# Patient Record
Sex: MALE | Race: WHITE | NOT HISPANIC OR LATINO | Employment: STUDENT | URBAN - METROPOLITAN AREA
[De-identification: names, ages, dates, MRNs, and addresses within clinical notes are randomized per-mention and may not be internally consistent; named-entity substitution may affect disease eponyms.]

---

## 2017-08-15 ENCOUNTER — TELEPHONE (OUTPATIENT)
Dept: INTERNAL MEDICINE | Facility: CLINIC | Age: 22
End: 2017-08-15

## 2017-08-15 ENCOUNTER — OFFICE VISIT (OUTPATIENT)
Dept: INTERNAL MEDICINE | Facility: CLINIC | Age: 22
End: 2017-08-15
Payer: COMMERCIAL

## 2017-08-15 VITALS
TEMPERATURE: 98.5 F | HEART RATE: 56 BPM | OXYGEN SATURATION: 98 % | SYSTOLIC BLOOD PRESSURE: 120 MMHG | HEIGHT: 75 IN | BODY MASS INDEX: 20.4 KG/M2 | DIASTOLIC BLOOD PRESSURE: 74 MMHG | WEIGHT: 164.1 LBS

## 2017-08-15 DIAGNOSIS — R21 RASH AND NONSPECIFIC SKIN ERUPTION: Primary | ICD-10-CM

## 2017-08-15 PROCEDURE — 99213 OFFICE O/P EST LOW 20 MIN: CPT | Performed by: INTERNAL MEDICINE

## 2017-08-15 NOTE — PROGRESS NOTES
SUBJECTIVE:                                                    Gautam Fan is a 21 year old male who presents to clinic today for the following health issues:      Rash  Onset: 4 days    Description:   Location: R thigh  Character: round, red  Itching (Pruritis): no     Progression of Symptoms:  same    Accompanying Signs & Symptoms:  Fever: no   Body aches or joint pain: no   Sore throat symptoms: no   Recent cold symptoms: no     History:   Previous similar rash: no     Precipitating factors:   Exposure to similar rash: no   New exposures: None, grasses and trees   Recent travel: not outside the country but [place] in [place]. Wearing pants the entire time, no ticks found on body.     Alleviating factors:  nothing    Therapies Tried and outcome: nothing           Problem list and histories reviewed & adjusted, as indicated.  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        Past Surgical History:  ---------------------------  No past surgical history on file.    Current Medications:  ---------------------------  Current Outpatient Prescriptions   Medication Sig Dispense Refill     Misc. Devices (HIBICLENS HAND PUMP 16OZ) MISC Apply 1 Application topically every 7 days Use once a week in the shower to prevent recurrence. 1 each 11     NO ACTIVE MEDICATIONS        clindamycin-benzoyl peroxide (BENZACLIN) gel Apply topically 2 times daily 50 g 11       Allergies:  -------------  Allergies   Allergen Reactions     Dust Mites      No Known Drug Allergies        Social History:  -------------------  Social History     Social  History     Marital status: Single     Spouse name: N/A     Number of children: N/A     Years of education: N/A     Occupational History     Not on file.     Social History Main Topics     Smoking status: Never Smoker     Smokeless tobacco: Never Used     Alcohol use No     Drug use: No     Sexual activity: Not Currently      Comment: 9/17/2012 ek  3/13/15 ss     Other Topics Concern     Not on file     Social History Narrative    2 brothers    Benny 1 yr younger    Max 5 yrs younger       Family Medical History:  ------------------------------  Family History   Problem Relation Age of Onset     Hypertension Mother      Cardiovascular Maternal Grandfather      CHF, pacemaker for bradycardia     Respiratory Maternal Uncle      sleep apnea     Asthma Other      first cousin has asthma     Cardiovascular Paternal Grandmother      C.A.D. No family hx of      DIABETES No family hx of      CEREBROVASCULAR DISEASE No family hx of      Blood Disease No family hx of      CANCER No family hx of      Connective Tissue Disorder No family hx of      Congenital Anomalies No family hx of      Depression No family hx of      Endocrine Disease No family hx of      Eye Disorder No family hx of      Genetic Disorder No family hx of      GASTROINTESTINAL DISEASE No family hx of      Genitourinary Problems No family hx of      Lipids No family hx of      Neurologic Disorder No family hx of      Musculoskeletal Disorder No family hx of      Psychotic Disorder No family hx of          ROS:  REVIEW OF SYSTEMS:    RESP: negative for cough, dyspnea, wheezing, hemoptysis  CV: negative for chest pain, palpitations, PND, VELASQUEZ, orthopnea; reports no changes in their ability to perform physical activity (from cardiovascular standpoint)  GI: negative for dysphagia, N/V, pain, melena, diarrhea and constipation  NEURO: negative for numbness/tingling, paralysis, incoordination, or focal weakness     OBJECTIVE:                                             "        /74  Pulse 56  Temp 98.5  F (36.9  C) (Oral)  Ht 6' 3\" (1.905 m)  Wt 164 lb 1.6 oz (74.4 kg)  SpO2 98%  BMI 20.51 kg/m2     GENERAL alert and no distress  EYES:  Normal sclera,conjunctiva, EOMI  HENT: oral and posterior pharynx without lesions or erythema, facies symmetric  NECK: Neck supple. No LAD, without thyroidmegaly or JVD., Carotids without bruits.  RESP: Clear to ausculation bilaterally without wheezes or crackles. Normal BS in all fields.  CV: RRR normal S1S2 without murmurs, rubs or gallops. PMI normal  LYMPH: no cervical lymph adenopathy appreciated  MS: extremities- no gross deformities of the visible extremities noted, no edema  PSYCH: Alert and oriented times 3; speech- coherent  SKIN:  No obvious significant skin lesions on visible portions of face  Small patch (3 cm in diameter approx) of nonspecific skin rash on anterior right thigh area, normal borders, does not itch, not warm, not consistent with cellulitis.   No obviosu bite marks.          ASSESSMENT/PLAN:                                                      (R21) Rash and nonspecific skin eruption  (primary encounter diagnosis)  Comment: I am not sure what this reprseents, but I am not worried about anythign ifectious.   He was worried about lyme disease, but this does not seem to fit  Obesrve.   Plan: return if any changes.        See Patient Instructions    ANDERSON ROSAS M.D., MD  [facility]   "

## 2017-08-15 NOTE — MR AVS SNAPSHOT
"              After Visit Summary   8/15/2017    Gautam Fan    MRN: [id number]           Patient Information     Date Of Birth          1995        Visit Information        Provider Department      8/15/2017 1:00 PM Gordon Cabral MD [facility]        Today's Diagnoses     Rash and nonspecific skin eruption    -  1       Follow-ups after your visit        Who to contact     If you have questions or need follow up information about today's clinic visit or your schedule please contact [facility] directly at [phone].  Normal or non-critical lab and imaging results will be communicated to you by Landingihart, letter or phone within 4 business days after the clinic has received the results. If you do not hear from us within 7 days, please contact the clinic through Landingihart or phone. If you have a critical or abnormal lab result, we will notify you by phone as soon as possible.  Submit refill requests through Nano Think or call your pharmacy and they will forward the refill request to us. Please allow 3 business days for your refill to be completed.          Additional Information About Your Visit        MyChart Information     Nano Think lets you send messages to your doctor, view your test results, renew your prescriptions, schedule appointments and more. To sign up, go to [email or web address] . Click on \"Log in\" on the left side of the screen, which will take you to the Welcome page. Then click on \"Sign up Now\" on the right side of the page.     You will be asked to enter the access code listed below, as well as some personal information. Please follow the directions to create your username and password.     Your access code is: [email or web address]  Expires: 11/15/2017  1:38 PM     Your access code will  in 90 days. If you need help or a new code, please call your [facility] or [phone].        Care EveryWhere ID     This is your Care " "EveryWhere ID. This could be used by other organizations to access your [place] medical records  [id number]        Your Vitals Were     Pulse Temperature Height Pulse Oximetry BMI (Body Mass Index)       56 98.5  F (36.9  C) (Oral) 6' 3\" (1.905 m) 98% 20.51 kg/m2        Blood Pressure from Last 3 Encounters:   08/17/17 143/75   08/15/17 120/74   06/24/16 136/79    Weight from Last 3 Encounters:   08/15/17 164 lb 1.6 oz (74.4 kg)   06/24/16 160 lb (72.6 kg) (58 %)*   01/07/16 157 lb 9.6 oz (71.5 kg) (57 %)*     * Growth percentiles are based on [facility] 2-20 Years data.              Today, you had the following     No orders found for display       Primary Care Provider Office Phone # Fax #    Gordon Cabral [phone]       [address] [place] [address]        Equal Access to Services     CYN HENSON : Hadii andres ku hadasho Soomaali, waaxda luqadaha, qaybta kaalmada adeegyada, olivia harris . So [facility] [phone].    ATENCIÓN: Si habla español, tiene a prescott disposición servicios gratuitos de asistencia lingüística. Llame al [phone].    We comply with applicable federal civil rights laws and [place] laws. We do not discriminate on the basis of race, color, national origin, age, disability sex, sexual orientation or gender identity.            Thank you!     Thank you for choosing [facility]  for your care. Our goal is always to provide you with excellent care. Hearing back from our patients is one way we can continue to improve our services. Please take a few minutes to complete the written survey that you may receive in the mail after your visit with us. Thank you!             Your Updated Medication List - Protect others around you: Learn how to safely use, store and throw away your medicines at www.disposemymeds.org.          This list is accurate as of: 8/15/17 11:59 PM.  Always use your most recent med list.                   " Brand Name Dispense Instructions for use Diagnosis    HIBICLENS HAND PUMP 16OZ Misc     1 each    Apply 1 Application topically every 7 days Use once a week in the shower to prevent recurrence.    Cellulitis       NO ACTIVE MEDICATIONS

## 2017-08-15 NOTE — NURSING NOTE
"Chief Complaint   Patient presents with     Derm Problem       Initial /74  Pulse 56  Temp 98.5  F (36.9  C) (Oral)  Ht 6' 3\" (1.905 m)  Wt 164 lb 1.6 oz (74.4 kg)  SpO2 98%  BMI 20.51 kg/m2 Estimated body mass index is 20.51 kg/(m^2) as calculated from the following:    Height as of this encounter: 6' 3\" (1.905 m).    Weight as of this encounter: 164 lb 1.6 oz (74.4 kg).  Medication Reconciliation: complete   Avis Hunter MA   "

## 2017-08-17 ENCOUNTER — OFFICE VISIT (OUTPATIENT)
Dept: DERMATOLOGY | Facility: CLINIC | Age: 22
End: 2017-08-17
Payer: COMMERCIAL

## 2017-08-17 VITALS — SYSTOLIC BLOOD PRESSURE: 143 MMHG | OXYGEN SATURATION: 99 % | HEART RATE: 66 BPM | DIASTOLIC BLOOD PRESSURE: 75 MMHG

## 2017-08-17 DIAGNOSIS — L73.9 FOLLICULITIS: Primary | ICD-10-CM

## 2017-08-17 PROCEDURE — 99203 OFFICE O/P NEW LOW 30 MIN: CPT | Performed by: DERMATOLOGY

## 2017-08-17 RX ORDER — CLINDAMYCIN AND BENZOYL PEROXIDE 10; 50 MG/G; MG/G
GEL TOPICAL 2 TIMES DAILY
Qty: 50 G | Refills: 11 | Status: SHIPPED | OUTPATIENT
Start: 2017-08-17 | End: 2023-06-28

## 2017-08-17 NOTE — NURSING NOTE
"Initial /75  Pulse 66  SpO2 99% Estimated body mass index is 20.51 kg/(m^2) as calculated from the following:    Height as of 8/15/17: 1.905 m (6' 3\").    Weight as of 8/15/17: 74.4 kg (164 lb 1.6 oz). .      "

## 2017-08-17 NOTE — MR AVS SNAPSHOT
"              After Visit Summary   2017    Gautam Fan    MRN: [id number]           Patient Information     Date Of Birth          1995        Visit Information        Provider Department      2017 1:00 PM Ramón Bear MD [facility]        Today's Diagnoses     Folliculitis    -  1       Follow-ups after your visit        Who to contact     If you have questions or need follow up information about today's clinic visit or your schedule please contact [facility] directly at [phone].  Normal or non-critical lab and imaging results will be communicated to you by Luxury Fashion Tradehart, letter or phone within 4 business days after the clinic has received the results. If you do not hear from us within 7 days, please contact the clinic through [employer] Tradehart or phone. If you have a critical or abnormal lab result, we will notify you by phone as soon as possible.  Submit refill requests through Zomato or call your pharmacy and they will forward the refill request to us. Please allow 3 business days for your refill to be completed.          Additional Information About Your Visit        MyChart Information     Zomato lets you send messages to your doctor, view your test results, renew your prescriptions, schedule appointments and more. To sign up, go to [email or web address] . Click on \"Log in\" on the left side of the screen, which will take you to the Welcome page. Then click on \"Sign up Now\" on the right side of the page.     You will be asked to enter the access code listed below, as well as some personal information. Please follow the directions to create your username and password.     Your access code is: [email or web address]  Expires: 11/15/2017  1:38 PM     Your access code will  in 90 days. If you need help or a new code, please call your [facility] or [phone].        Care EveryWhere ID     This is your Care EveryWhere ID. This could be " used by other organizations to access your [place] medical records  [id number]        Your Vitals Were     Pulse Pulse Oximetry                66 99%           Blood Pressure from Last 3 Encounters:   08/17/17 143/75   08/15/17 120/74   06/24/16 136/79    Weight from Last 3 Encounters:   08/15/17 74.4 kg (164 lb 1.6 oz)   06/24/16 72.6 kg (160 lb) (58 %)*   01/07/16 71.5 kg (157 lb 9.6 oz) (57 %)*     * Growth percentiles are based on [facility] 2-20 Years data.              Today, you had the following     No orders found for display         Today's Medication Changes          These changes are accurate as of: 8/17/17  1:38 PM.  If you have any questions, ask your nurse or doctor.               Start taking these medicines.        Dose/Directions    clindamycin-benzoyl peroxide gel   Commonly known as:  BENZACLIN   Used for:  Folliculitis   Started by:  Ramón Bear MD        Apply topically 2 times daily   Quantity:  50 g   Refills:  11            Where to get your medicines      These medications were sent to [facility]/pharmacy #[id number] [address] [address]     Phone:  [phone]     clindamycin-benzoyl peroxide gel                Primary Care Provider Office Phone # Fax #    Gordon Cabral [phone]       [address] [facility] [address]        Equal Access to Services     CYN HENSON AH: Hadii andres daviso Soglenn, waaxda luqadaha, qaybta kaalmada lui, olivia low [facility] pryor. So [facility] [phone].    ATENCIÓN: Si habla español, tiene a prescott disposición servicios gratuitos de asistencia lingüística. Llame al [phone].    We comply with applicable federal civil rights laws and [place] laws. We do not discriminate on the basis of race, color, national origin, age, disability sex, sexual orientation or gender identity.            Thank you!     Thank you for choosing [facility]  [facility]  for your care. Our goal is always to provide you with excellent care. Hearing back from our patients is one way we can continue to improve our services. Please take a few minutes to complete the written survey that you may receive in the mail after your visit with us. Thank you!             Your Updated Medication List - Protect others around you: Learn how to safely use, store and throw away your medicines at www.disposemymeds.org.          This list is accurate as of: 8/17/17  1:38 PM.  Always use your most recent med list.                   Brand Name Dispense Instructions for use Diagnosis    clindamycin-benzoyl peroxide gel    BENZACLIN    50 g    Apply topically 2 times daily    Folliculitis       HIBICLENS HAND PUMP 16OZ Misc     1 each    Apply 1 Application topically every 7 days Use once a week in the shower to prevent recurrence.    Cellulitis       NO ACTIVE MEDICATIONS

## 2017-08-17 NOTE — PROGRESS NOTES
Gautam Fan is a 21 year old year old male patient here today for spots on buttocks.   .  Patient states this has been present for 4 years.  Patient reports the following symptoms:  Pimples, tender, cyst.  .  Patient reports the following previous treatments hibiclens .  Patient reports the following modifying factors none.  Associated symptoms: none.  Patient has no other skin complaints today.  Remainder of the HPI, Meds, PMH, Allergies, FH, and SH was reviewed in chart.      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        History reviewed. No pertinent surgical history.     Family History   Problem Relation Age of Onset     Hypertension Mother      Cardiovascular Maternal Grandfather      CHF, pacemaker for bradycardia     Respiratory Maternal Uncle      sleep apnea     Asthma Other      first cousin has asthma     Cardiovascular Paternal Grandmother      C.A.D. No family hx of      DIABETES No family hx of      CEREBROVASCULAR DISEASE No family hx of      Blood Disease No family hx of      CANCER No family hx of      Connective Tissue Disorder No family hx of      Congenital Anomalies No family hx of      Depression No family hx of      Endocrine Disease No family hx of      Eye Disorder No family hx of      Genetic Disorder No family hx of      GASTROINTESTINAL DISEASE No family hx of      Genitourinary Problems No family hx of      Lipids No family hx of      Neurologic Disorder No family hx of      Musculoskeletal Disorder No family hx of      Psychotic Disorder No family hx of        Social History     Social History     Marital status: Single     Spouse name: N/A     Number of children: N/A     Years of education: N/A     Occupational History     Not on file.     Social History Main Topics     Smoking status: Never Smoker     Smokeless tobacco: Never Used     Alcohol use No      Drug use: No     Sexual activity: Not Currently      Comment: 9/17/2012 ek  3/13/15 ss     Other Topics Concern     Not on file     Social History Narrative    2 brothers    Benny 1 yr younger    Max 5 yrs younger       Outpatient Encounter Prescriptions as of 8/17/2017   Medication Sig Dispense Refill     clindamycin-benzoyl peroxide (BENZACLIN) gel Apply topically 2 times daily 50 g 11     Misc. Devices (HIBICLENS HAND PUMP 16OZ) MISC Apply 1 Application topically every 7 days Use once a week in the shower to prevent recurrence. 1 each 11     NO ACTIVE MEDICATIONS        No facility-administered encounter medications on file as of 8/17/2017.              Review Of Systems  Skin: As above  Eyes: negative  Ears/Nose/Throat: negative  Respiratory: No shortness of breath, dyspnea on exertion, cough, or hemoptysis  Cardiovascular: negative  Gastrointestinal: negative  Genitourinary: negative  Musculoskeletal: negative  Neurologic: negative  Psychiatric: negative  Hematologic/Lymphatic/Immunologic: negative  Endocrine: negative      O:   NAD, WDWN, Alert & Oriented, Mood & Affect wnl, Vitals stable   Here today alone   /75  Pulse 66  SpO2 99%   General appearance normal   Vitals stable   Alert, oriented and in no acute distress     infalmmatory papules on buttocks with crusted areas      The remainder of expanded problem focused exam was unremarkable; the following areas were examined:  Hair , conjunctiva/lids, face, neck, lips       Eyes: Conjunctivae/lids:Normal     ENT: Lips, buccal mucosa, tongue: normal    MSK:Normal    Cardiovascular: peripheral edema none    Pulm: Breathing Normal    Neuro/Psych: Orientation:Normal; Mood/Affect:Normal      A/P:  1. folliclulitis   Oral abx discussed with patient   He declines oral options   He is currently using hibiclens   benzaclin twice daily  Return to clinic 3 months  Skin care regimen reviewed with patient: Eliminate harsh soaps, i.e. Dial, zest, irsih spring;  Mild soaps such as Cetaphil or Dove sensitive skin, avoid hot or cold showers, aggressive use of emollients including vanicream, cetaphil or cerave discussed with patient.

## 2017-10-07 ENCOUNTER — HEALTH MAINTENANCE LETTER (OUTPATIENT)
Age: 22
End: 2017-10-07

## 2017-10-28 ENCOUNTER — HEALTH MAINTENANCE LETTER (OUTPATIENT)
Age: 22
End: 2017-10-28

## 2018-06-30 ENCOUNTER — NURSE TRIAGE (OUTPATIENT)
Dept: NURSING | Facility: CLINIC | Age: 23
End: 2018-06-30

## 2018-07-01 NOTE — TELEPHONE ENCOUNTER
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   Was diagnosed with an ear infection about a week ago. Prescribed Amoxicillin.   Then was diagnosed with mono. Was seen today at an UC/ ED as he is covered in a rash.   Caller is wondering what Gautam should do since where he was seen didn't provide much direction on home care.     FNA advised that it's our policy to provide triage advice when the patient is present.   Also, unable to provide advice for a patient who is outside of [place] or [place].   FNA inquired if Gautam could call himself, but Ora states that he isn't feeling well.   FNA advised to reference the paperwork that he got where he was seen.   Recommended to call the facility where he was at and maybe there is a nurse line that would be able to provide advice based on the diagnosis that was made today.   FNA advised to call back with further questions or concerns.   Caller verbalized understanding of and agreement with plan and had no further questions.     Odette Hou RN  [facility]

## 2022-07-21 ENCOUNTER — HOSPITAL ENCOUNTER (EMERGENCY)
Facility: CLINIC | Age: 27
Discharge: HOME | End: 2022-07-21
Attending: PHYSICIAN ASSISTANT | Admitting: PHYSICIAN ASSISTANT
Payer: COMMERCIAL

## 2022-07-21 ENCOUNTER — APPOINTMENT (OUTPATIENT)
Dept: MRI IMAGING | Facility: CLINIC | Age: 27
End: 2022-07-21
Attending: PHYSICIAN ASSISTANT
Payer: COMMERCIAL

## 2022-07-21 VITALS
DIASTOLIC BLOOD PRESSURE: 98 MMHG | TEMPERATURE: 99.1 F | BODY MASS INDEX: 19.48 KG/M2 | HEART RATE: 89 BPM | OXYGEN SATURATION: 98 % | RESPIRATION RATE: 16 BRPM | WEIGHT: 160 LBS | HEIGHT: 76 IN | SYSTOLIC BLOOD PRESSURE: 140 MMHG

## 2022-07-21 DIAGNOSIS — M51.26 LUMBAR DISC HERNIATION: ICD-10-CM

## 2022-07-21 DIAGNOSIS — M54.41 MIDLINE LOW BACK PAIN WITH RIGHT-SIDED SCIATICA: ICD-10-CM

## 2022-07-21 LAB

## 2022-07-21 PROCEDURE — 72148 MRI LUMBAR SPINE W/O DYE: CPT

## 2022-07-21 PROCEDURE — 81001 URINALYSIS AUTO W/SCOPE: CPT | Performed by: PHYSICIAN ASSISTANT

## 2022-07-21 PROCEDURE — 99283 EMERGENCY DEPT VISIT LOW MDM: CPT

## 2022-07-21 RX ORDER — METHYLPREDNISOLONE 4 MG
TABLET, DOSE PACK ORAL
Qty: 21 TABLET | Refills: 0 | Status: SHIPPED | OUTPATIENT
Start: 2022-07-21 | End: 2023-06-28

## 2022-07-21 ASSESSMENT — ENCOUNTER SYMPTOMS
DIFFICULTY URINATING: 1
CHILLS: 0
FEVER: 0
NUMBNESS: 1

## 2022-07-21 NOTE — ED TRIAGE NOTES
Pt has chronic back pain with numbness/tingling down R leg. Last couple of days, patient states he feels like he is having feelings like he won't be able to hold his BM in. Patient reinjured back approx 3 weeks ago.

## 2022-07-21 NOTE — ED PROVIDER NOTES
"  History   Chief Complaint:  Back Pain       The history is provided by the patient.      Gautam Fan is a 26 year old male with history of back pain who presents for evaluation of low back pain and right leg pain and paresthesias. He reports a past back injury that flared up about 1.5 months ago while exercising. The patient reports pain, with numbness and tingling into the right leg, which is typical for his flare-ups. His current symptoms are the same and began a couple days ago. The patient reports mild back pain and some numbness, with more pain and numbness in his right leg. He also reports concern about recent sensations of lost bowel control, noting he feels that he is consciously trying to prevent bowel movements. The patient mentions losing sleep last night due to these symptoms. He also notes occasional difficulty urinating. He denies fever and chills. The patient denies past low back surgeries, cancer, IV drug use, blood thinners. He states that he is otherwise healthy.  No abdominal pain or hematuria.  No known direct trauma. No leg swelling.    Review of Systems   Constitutional: Negative for chills and fever.   Gastrointestinal:        (+) incontinence sensations   Genitourinary: Positive for difficulty urinating.   Neurological: Positive for numbness.        (+) paresthesias    All other systems reviewed and are negative.    Allergies:  Dust Mites  Amoxicillin     Medications:  The patient is currently on no regular medications.     Past Medical History:     Osgood-Schlatter's disease   Mononucleosis     Past Surgical History:    Denies past back surgeries     Family History:    Hypertension     Social History:  The patient presents alone.  Presents to the ED by private vehicle.      Physical Exam     Patient Vitals for the past 24 hrs:   BP Temp Temp src Pulse Resp SpO2 Height Weight   07/21/22 1359 -- -- -- -- -- -- 1.93 m (6' 4\") 72.6 kg (160 lb)   07/21/22 1357 (!) 140/98 99.1  F (37.3  C) " Temporal 89 16 98 % -- --       Physical Exam  General: Awake, alert, non-toxic.  Head:  Scalp is NC/AT  Eyes:  Conjunctiva normal, PERRL  ENT:  The external nose and ears are normal.   Neck:  Normal range of motion without rigidity.  CV:  Regular rate and rhythm    No pathologic murmur, rubs, or gallops.  Resp:  Breath sounds are clear bilaterally    Non-labored, no retractions or accessory muscle use  Abdomen: Abdomen is soft, no distension, no tenderness, no masses. No CVA tenderness.  MS:  No lower extremity edema or asymmetric calf swelling. No midline cervical, thoracic, or lumbar tenderness  Skin:  Warm and dry, No rash or lesions noted. 2+ DP and PT pulses BL in LE.  Warm and wel  want to go seel perfused   Neuro: Alert and oriented.  GCS 15 5/5 strength BL in UE and LE, normal sensation to touch.  Gait normal  Psych:  Awake. Alert. Normal affect. Appropriate interactions.    Emergency Department Course     Imaging:  Lumbar spine MRI w/o contrast   Preliminary Result   IMPRESSION:   1.  Mild disc degeneration L5-S1 with mild disc bulge. No marrow edema.   2.  No spinal canal or foraminal stenosis.      Report per radiology    Laboratory:  Labs Ordered and Resulted from Time of ED Arrival to Time of ED Departure   ROUTINE UA WITH MICROSCOPIC REFLEX TO CULTURE - Abnormal       Result Value    Color Urine Straw      Appearance Urine Clear      Glucose Urine Negative      Bilirubin Urine Negative      Ketones Urine Negative      Specific Gravity Urine 1.006      Blood Urine Negative      pH Urine 5.5      Protein Albumin Urine Negative      Urobilinogen Urine Normal      Nitrite Urine Negative      Leukocyte Esterase Urine Negative      Mucus Urine Present (*)     RBC Urine 0      WBC Urine <1            Emergency Department Course:     Reviewed:  I reviewed nursing notes, vitals, past medical history and Care Everywhere    Assessments:  1503 I obtained history and examined the patient as noted above.   1855 I  rechecked the patient and explained findings.     Disposition:  The patient was discharged to home.     Impression & Plan     Medical Decision Makin-year-old male presents with low back pain and concern for some abnormal sensation in the rectal area.  Broad differential considered.  Fortunately MRI shows mild L5-S1 disc herniation but cauda equina and spinal cord is normal neurologic exam is normal and there is no evidence of neurosurgical emergency, fracture, or other abnormality.  There is no clinical evidence of spinal infection such as epidural abscess, discitis, or osteomyelitis and no risk factors for this.  No evidence of malignancy.  Urine is unremarkable.  Abdominal exam benign and nontender not suggestive of referred pain.  Symptoms are consistent with disc herniation with radiculopathy.  Plan will be conservative trial of steroids already has follow-up scheduled with neurosurgery next week.  Discussed precautions for lifting bending twisting.  OTC analgesics as needed.  Return for bowel or bladder incontinence fever numbness or weakness worsening in the legs increased pain migration of pain or other concerns.    Diagnosis:    ICD-10-CM    1. Lumbar disc herniation  M51.26    2. Midline low back pain with right-sided sciatica  M54.41        Discharge Medications:  New Prescriptions    METHYLPREDNISOLONE (MEDROL DOSEPAK) 4 MG TABLET THERAPY PACK    Follow Package Directions       Scribe Disclosure:  I, Triny Molina, am serving as a scribe at 3:02 PM on 2022 to document services personally performed by Mor Conde PA-C based on my observations and the provider's statements to me.          Mor Conde PA-C  [id number]

## 2023-06-27 ASSESSMENT — ENCOUNTER SYMPTOMS
DYSURIA: 0
DIARRHEA: 0
COUGH: 0
ABDOMINAL PAIN: 0
HEADACHES: 0
HEARTBURN: 0
JOINT SWELLING: 0
NERVOUS/ANXIOUS: 1
SORE THROAT: 0
CHILLS: 0
PALPITATIONS: 0
EYE PAIN: 0
CONSTIPATION: 1
HEMATURIA: 0
MYALGIAS: 1
HEMATOCHEZIA: 1
WEAKNESS: 0
PARESTHESIAS: 0
FREQUENCY: 0
NAUSEA: 0
SHORTNESS OF BREATH: 0
DIZZINESS: 0
FEVER: 0
ARTHRALGIAS: 0

## 2023-06-28 ENCOUNTER — OFFICE VISIT (OUTPATIENT)
Dept: FAMILY MEDICINE | Facility: CLINIC | Age: 28
End: 2023-06-28
Payer: COMMERCIAL

## 2023-06-28 VITALS
RESPIRATION RATE: 14 BRPM | OXYGEN SATURATION: 99 % | BODY MASS INDEX: 19.48 KG/M2 | HEART RATE: 74 BPM | SYSTOLIC BLOOD PRESSURE: 138 MMHG | HEIGHT: 76 IN | TEMPERATURE: 98 F | WEIGHT: 160 LBS | DIASTOLIC BLOOD PRESSURE: 86 MMHG

## 2023-06-28 DIAGNOSIS — Z00.00 ANNUAL PHYSICAL EXAM: Primary | ICD-10-CM

## 2023-06-28 DIAGNOSIS — Z00.00 ROUTINE GENERAL MEDICAL EXAMINATION AT A HEALTH CARE FACILITY: ICD-10-CM

## 2023-06-28 DIAGNOSIS — Z11.59 NEED FOR HEPATITIS C SCREENING TEST: ICD-10-CM

## 2023-06-28 DIAGNOSIS — Z13.220 SCREENING FOR LIPID DISORDERS: ICD-10-CM

## 2023-06-28 DIAGNOSIS — L20.84 INTRINSIC ECZEMA: ICD-10-CM

## 2023-06-28 DIAGNOSIS — Z01.89 PATIENT REQUEST FOR DIAGNOSTIC TESTING: ICD-10-CM

## 2023-06-28 DIAGNOSIS — Z11.4 SCREENING FOR HIV (HUMAN IMMUNODEFICIENCY VIRUS): ICD-10-CM

## 2023-06-28 LAB
ALBUMIN SERPL BCG-MCNC: 5.1 G/DL (ref 3.5–5.2)
ALP SERPL-CCNC: 58 U/L (ref 40–129)
ALT SERPL W P-5'-P-CCNC: 15 U/L (ref 0–70)
ANION GAP SERPL CALCULATED.3IONS-SCNC: 13 MMOL/L (ref 7–15)
AST SERPL W P-5'-P-CCNC: 23 U/L (ref 0–45)
BILIRUB SERPL-MCNC: 0.7 MG/DL
BUN SERPL-MCNC: 9.2 MG/DL (ref 6–20)
CALCIUM SERPL-MCNC: 9.6 MG/DL (ref 8.6–10)
CHLORIDE SERPL-SCNC: 102 MMOL/L (ref 98–107)
CHOLEST SERPL-MCNC: 133 MG/DL
CREAT SERPL-MCNC: 0.85 MG/DL (ref 0.67–1.17)
DEPRECATED HCO3 PLAS-SCNC: 25 MMOL/L (ref 22–29)
ERYTHROCYTE [DISTWIDTH] IN BLOOD BY AUTOMATED COUNT: 12.8 % (ref 10–15)
GFR SERPL CREATININE-BSD FRML MDRD: >90 ML/MIN/1.73M2
GLUCOSE SERPL-MCNC: 88 MG/DL (ref 70–99)
HCT VFR BLD AUTO: 43.4 % (ref 40–53)
HDLC SERPL-MCNC: 59 MG/DL
HGB BLD-MCNC: 14.4 G/DL (ref 13.3–17.7)
LDLC SERPL CALC-MCNC: 65 MG/DL
MCH RBC QN AUTO: 30.4 PG (ref 26.5–33)
MCHC RBC AUTO-ENTMCNC: 33.2 G/DL (ref 31.5–36.5)
MCV RBC AUTO: 92 FL (ref 78–100)
NONHDLC SERPL-MCNC: 74 MG/DL
PLATELET # BLD AUTO: 311 10E3/UL (ref 150–450)
POTASSIUM SERPL-SCNC: 4.4 MMOL/L (ref 3.4–5.3)
PROT SERPL-MCNC: 7.8 G/DL (ref 6.4–8.3)
RBC # BLD AUTO: 4.74 10E6/UL (ref 4.4–5.9)
SODIUM SERPL-SCNC: 140 MMOL/L (ref 136–145)
TRIGL SERPL-MCNC: 43 MG/DL
WBC # BLD AUTO: 5.1 10E3/UL (ref 4–11)

## 2023-06-28 PROCEDURE — 87389 HIV-1 AG W/HIV-1&-2 AB AG IA: CPT | Performed by: INTERNAL MEDICINE

## 2023-06-28 PROCEDURE — 86803 HEPATITIS C AB TEST: CPT | Performed by: INTERNAL MEDICINE

## 2023-06-28 PROCEDURE — 80061 LIPID PANEL: CPT | Performed by: INTERNAL MEDICINE

## 2023-06-28 PROCEDURE — 36415 COLL VENOUS BLD VENIPUNCTURE: CPT | Performed by: INTERNAL MEDICINE

## 2023-06-28 PROCEDURE — 85027 COMPLETE CBC AUTOMATED: CPT | Performed by: INTERNAL MEDICINE

## 2023-06-28 PROCEDURE — 83655 ASSAY OF LEAD: CPT | Mod: 90 | Performed by: INTERNAL MEDICINE

## 2023-06-28 PROCEDURE — 99385 PREV VISIT NEW AGE 18-39: CPT | Performed by: INTERNAL MEDICINE

## 2023-06-28 PROCEDURE — 80053 COMPREHEN METABOLIC PANEL: CPT | Performed by: INTERNAL MEDICINE

## 2023-06-28 PROCEDURE — 99000 SPECIMEN HANDLING OFFICE-LAB: CPT | Performed by: INTERNAL MEDICINE

## 2023-06-28 PROCEDURE — 83825 ASSAY OF MERCURY: CPT | Mod: 90 | Performed by: INTERNAL MEDICINE

## 2023-06-28 ASSESSMENT — ENCOUNTER SYMPTOMS
PARESTHESIAS: 0
MYALGIAS: 1
PALPITATIONS: 0
HEARTBURN: 0
HEMATURIA: 0
CHILLS: 0
HEADACHES: 0
SHORTNESS OF BREATH: 0
JOINT SWELLING: 0
NAUSEA: 0
SORE THROAT: 0
DIZZINESS: 0
FEVER: 0
EYE PAIN: 0
ABDOMINAL PAIN: 0
COUGH: 0
ARTHRALGIAS: 0
CONSTIPATION: 1
DYSURIA: 0
NERVOUS/ANXIOUS: 1
FREQUENCY: 0
HEMATOCHEZIA: 1
WEAKNESS: 0
DIARRHEA: 0

## 2023-06-28 ASSESSMENT — PAIN SCALES - GENERAL: PAINLEVEL: NO PAIN (0)

## 2023-06-28 NOTE — PROGRESS NOTES
SUBJECTIVE:   CC: Gautam is an 27 year old who presents for preventative health visit.     27-year-old man comes in for annual physical examination.  He is requesting a blood test for mercury and Lead .  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  He is concerned about mercury fumes though there is no clear history of exposure.  Patient informed that I can order the test he requests I will order it may be out-of-pocket expense for if his insurance does not cover the cost.    His chronic eczema of the hand and plans to see a dermatologist.         No data to display              Healthy Habits:     Getting at least 3 servings of Calcium per day:  NO    Bi-annual eye exam:  NO    Dental care twice a year:  Yes    Sleep apnea or symptoms of sleep apnea:  None    Diet:  Regular (no restrictions)    Frequency of exercise:  4-5 days/week    Duration of exercise:  30-45 minutes    Taking medications regularly:  Yes    PHQ-2 Total Score: 0    Additional concerns today:  No              Hypertension Follow-up      Do you check your blood pressure regularly outside of the clinic? No     Are you following a low salt diet? No    Are your blood pressures ever more than 140 on the top number (systolic) OR more   than 90 on the bottom number (diastolic), for example 140/90? Yes      Today's PHQ-2 Score:       6/27/2023    10:53 PM   PHQ-2 ( 1999 [employer])   Q1: Little interest or pleasure in doing things 0   Q2: Feeling down, depressed or hopeless 0   PHQ-2 Score 0   Q1: Little interest or pleasure in doing things Not at all   Q2: Feeling down, depressed or hopeless Not at all   PHQ-2 Score 0       Have you ever done Advance Care Planning? (For example, a Health Directive, POLST, or a discussion with a medical provider or your loved ones about your wishes): No, advance care planning information given to patient to review.  Patient declined advance care planning discussion at this time.    Social History      Tobacco Use     Smoking status: Never     Smokeless tobacco: Never   Substance Use Topics     Alcohol use: No             6/27/2023    10:52 PM   Alcohol Use   Prescreen: >3 drinks/day or >7 drinks/week? No       Last PSA: No results found for: PSA    Reviewed orders with patient. Reviewed health maintenance and updated orders accordingly - Yes  BP Readings from Last 3 Encounters:   06/28/23 138/86   07/21/22 (!) 140/98   08/17/17 143/75    Wt Readings from Last 3 Encounters:   06/28/23 72.6 kg (160 lb)   07/21/22 72.6 kg (160 lb)   08/15/17 74.4 kg (164 lb 1.6 oz)                  Patient Active Problem List   Diagnosis     Routine infant or child health check     Routine sports physical exam     Osgood-Schlatter's disease     CARDIOVASCULAR SCREENING; LDL GOAL LESS THAN 160     No past surgical history on file.    Social History     Tobacco Use     Smoking status: Never     Smokeless tobacco: Never   Substance Use Topics     Alcohol use: No     Family History   Problem Relation Age of Onset     Hypertension Mother      Obesity Mother      Cardiovascular Maternal Grandfather         CHF, pacemaker for bradycardia     Cardiovascular Paternal Grandmother      Respiratory Maternal Uncle         sleep apnea     Asthma Other         first cousin has asthma     C.A.D. No family hx of      Diabetes No family hx of      Cerebrovascular Disease No family hx of      Blood Disease No family hx of      Cancer No family hx of      Connective Tissue Disorder No family hx of      Congenital Anomalies No family hx of      Depression No family hx of      Endocrine Disease No family hx of      Eye Disorder No family hx of      Genetic Disorder No family hx of      Gastrointestinal Disease No family hx of      Genitourinary Problems No family hx of      Lipids No family hx of      Neurologic Disorder No family hx of      Musculoskeletal Disorder No family hx of      Psychotic Disorder No family hx of          Current Outpatient  Medications   Medication Sig Dispense Refill     NO ACTIVE MEDICATIONS        Allergies   Allergen Reactions     Dust Mites      No Known Drug Allergy        Reviewed and updated as needed this visit by clinical staff                  Reviewed and updated as needed this visit by Provider                 Past Medical History:   Diagnosis Date     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       No past surgical history on file.    Review of Systems   Constitutional: Negative for chills and fever.   HENT: Negative for congestion, ear pain, hearing loss and sore throat.    Eyes: Negative for pain and visual disturbance.   Respiratory: Negative for cough and shortness of breath.    Cardiovascular: Negative for chest pain, palpitations and peripheral edema.   Gastrointestinal: Positive for constipation and hematochezia. Negative for abdominal pain, diarrhea, heartburn and nausea.   Genitourinary: Negative for dysuria, frequency, genital sores, hematuria, impotence, penile discharge and urgency.   Musculoskeletal: Positive for myalgias. Negative for arthralgias and joint swelling.   Skin: Positive for rash.   Neurological: Negative for dizziness, weakness, headaches and paresthesias.   Psychiatric/Behavioral: Negative for mood changes. The patient is nervous/anxious.          OBJECTIVE:   There were no vitals taken for this visit.    Physical Exam  GENERAL: healthy, alert and no distress  EYES: Eyes grossly normal to inspection, PERRL and conjunctivae and sclerae normal  HENT: ear canals and TM's normal, nose and mouth without ulcers or lesions  NECK: no adenopathy, no asymmetry, masses, or scars and thyroid normal to palpation  RESP: lungs clear to auscultation - no rales, rhonchi or wheezes  CV: regular rate and rhythm, normal S1 S2, no S3 or S4, no murmur, click or rub, no peripheral edema and peripheral pulses strong  ABDOMEN: soft,  nontender, no hepatosplenomegaly, no masses and bowel sounds normal   (male): normal male genitalia without lesions or urethral discharge, no hernia  MS: no gross musculoskeletal defects noted, no edema  SKIN: Dry erythematous rash dorsal surface of the right hand consistent with eczema   NEURO: Normal strength and tone, mentation intact and speech normal  PSYCH: mentation appears normal, affect normal/bright  LYMPH: no cervical, supraclavicular, axillary, or inguinal adenopathy        ASSESSMENT/PLAN:     1.  Annual physical exam completed.  Overall exam is good.  2.  Intrinsic eczema right hand.  Advised to see a dermatologist.  3.  Patient requesting diagnostic testing i.e. lead and mercury levels.  Test ordered.  4.  Screening for hepatitis C and HIV.    Patient will have above-mentioned lab along with CBC, CMP and lipids.  I will get back to him with results.        Patient has been advised of split billing requirements and indicates understanding: Yes      COUNSELING:   Reviewed preventive health counseling, as reflected in patient instructions       Regular exercise       Healthy diet/nutrition       Vision screening        He reports that he has never smoked. He has never used smokeless tobacco.            Nicholas Peralta MD  [facility]

## 2023-06-29 LAB — HCV AB SERPL QL IA: NONREACTIVE

## 2023-06-30 LAB
HIV 1+2 AB+HIV1 P24 AG SERPL QL IA: NONREACTIVE
LEAD BLDV-MCNC: <2 UG/DL
MERCURY BLD-MCNC: <2.5 UG/L

## 2023-08-14 ENCOUNTER — MYC MEDICAL ADVICE (OUTPATIENT)
Dept: FAMILY MEDICINE | Facility: CLINIC | Age: 28
End: 2023-08-14
Payer: COMMERCIAL

## 2024-08-10 ENCOUNTER — HEALTH MAINTENANCE LETTER (OUTPATIENT)
Age: 29
End: 2024-08-10

## 2024-08-14 ENCOUNTER — E-VISIT (OUTPATIENT)
Dept: FAMILY MEDICINE | Facility: CLINIC | Age: 29
End: 2024-08-14

## 2024-08-14 DIAGNOSIS — R21 RASH: Primary | ICD-10-CM

## 2024-08-14 PROCEDURE — 99421 OL DIG E/M SVC 5-10 MIN: CPT | Performed by: INTERNAL MEDICINE

## 2024-08-14 RX ORDER — TRIAMCINOLONE ACETONIDE 1 MG/G
CREAM TOPICAL 2 TIMES DAILY
Qty: 80 G | Refills: 0 | Status: SHIPPED | OUTPATIENT
Start: 2024-08-14

## 2025-08-10 ENCOUNTER — OFFICE VISIT (OUTPATIENT)
Dept: URGENT CARE | Facility: URGENT CARE | Age: 30
End: 2025-08-10

## 2025-08-10 VITALS
HEIGHT: 76 IN | OXYGEN SATURATION: 97 % | WEIGHT: 161 LBS | TEMPERATURE: 98.4 F | BODY MASS INDEX: 19.61 KG/M2 | HEART RATE: 60 BPM | SYSTOLIC BLOOD PRESSURE: 135 MMHG | DIASTOLIC BLOOD PRESSURE: 77 MMHG | RESPIRATION RATE: 18 BRPM

## 2025-08-10 DIAGNOSIS — L20.84 INTRINSIC ATOPIC DERMATITIS: Primary | ICD-10-CM

## 2025-08-10 PROCEDURE — 99213 OFFICE O/P EST LOW 20 MIN: CPT | Performed by: INTERNAL MEDICINE

## 2025-08-10 RX ORDER — TRIAMCINOLONE ACETONIDE 1 MG/G
CREAM TOPICAL 2 TIMES DAILY
Qty: 30 G | Refills: 4 | Status: SHIPPED | OUTPATIENT
Start: 2025-08-10

## 2025-08-10 RX ORDER — DOXYCYCLINE 100 MG/1
100 CAPSULE ORAL 2 TIMES DAILY
Qty: 20 CAPSULE | Refills: 0 | Status: SHIPPED | OUTPATIENT
Start: 2025-08-10 | End: 2025-08-20

## 2025-08-16 ENCOUNTER — HEALTH MAINTENANCE LETTER (OUTPATIENT)
Age: 30
End: 2025-08-16